# Patient Record
Sex: FEMALE | Race: ASIAN | NOT HISPANIC OR LATINO | Employment: STUDENT | URBAN - METROPOLITAN AREA
[De-identification: names, ages, dates, MRNs, and addresses within clinical notes are randomized per-mention and may not be internally consistent; named-entity substitution may affect disease eponyms.]

---

## 2019-05-12 ENCOUNTER — OFFICE VISIT (OUTPATIENT)
Dept: URGENT CARE | Facility: CLINIC | Age: 24
End: 2019-05-12
Payer: COMMERCIAL

## 2019-05-12 VITALS
HEART RATE: 67 BPM | SYSTOLIC BLOOD PRESSURE: 98 MMHG | DIASTOLIC BLOOD PRESSURE: 57 MMHG | RESPIRATION RATE: 16 BRPM | BODY MASS INDEX: 18.31 KG/M2 | HEIGHT: 61 IN | TEMPERATURE: 98.2 F | WEIGHT: 97 LBS

## 2019-05-12 DIAGNOSIS — J04.0 ACUTE LARYNGITIS: Primary | ICD-10-CM

## 2019-05-12 PROCEDURE — 99213 OFFICE O/P EST LOW 20 MIN: CPT | Performed by: PHYSICIAN ASSISTANT

## 2019-05-12 RX ORDER — FAMOTIDINE 20 MG
1000 TABLET ORAL DAILY
COMMUNITY

## 2019-05-12 RX ORDER — NORETHINDRONE ACETATE AND ETHINYL ESTRADIOL 1MG-20(21)
1 KIT ORAL DAILY
COMMUNITY
Start: 2018-07-22

## 2019-05-12 RX ORDER — ATORVASTATIN CALCIUM 10 MG/1
TABLET, FILM COATED ORAL
Refills: 0 | COMMUNITY
Start: 2019-05-07 | End: 2020-06-04

## 2020-06-04 ENCOUNTER — OFFICE VISIT (OUTPATIENT)
Dept: FAMILY MEDICINE CLINIC | Facility: CLINIC | Age: 25
End: 2020-06-04
Payer: COMMERCIAL

## 2020-06-04 VITALS
OXYGEN SATURATION: 99 % | HEART RATE: 75 BPM | DIASTOLIC BLOOD PRESSURE: 62 MMHG | TEMPERATURE: 98.2 F | SYSTOLIC BLOOD PRESSURE: 90 MMHG | WEIGHT: 97 LBS | BODY MASS INDEX: 18.31 KG/M2 | RESPIRATION RATE: 16 BRPM | HEIGHT: 61 IN

## 2020-06-04 DIAGNOSIS — E78.2 MIXED HYPERLIPIDEMIA: ICD-10-CM

## 2020-06-04 DIAGNOSIS — Z02.0 SCHOOL PHYSICAL EXAM: ICD-10-CM

## 2020-06-04 DIAGNOSIS — E55.9 VITAMIN D DEFICIENCY: ICD-10-CM

## 2020-06-04 DIAGNOSIS — E78.1 HYPERTRIGLYCERIDEMIA: ICD-10-CM

## 2020-06-04 DIAGNOSIS — Z28.39 IMMUNIZATION DEFICIENCY: Primary | ICD-10-CM

## 2020-06-04 PROCEDURE — 90715 TDAP VACCINE 7 YRS/> IM: CPT

## 2020-06-04 PROCEDURE — 99214 OFFICE O/P EST MOD 30 MIN: CPT | Performed by: FAMILY MEDICINE

## 2020-06-04 PROCEDURE — 3008F BODY MASS INDEX DOCD: CPT | Performed by: FAMILY MEDICINE

## 2020-06-04 PROCEDURE — 1036F TOBACCO NON-USER: CPT | Performed by: FAMILY MEDICINE

## 2020-06-04 PROCEDURE — 90471 IMMUNIZATION ADMIN: CPT

## 2020-06-04 RX ORDER — ICOSAPENT ETHYL 1000 MG/1
1 CAPSULE ORAL 2 TIMES DAILY
Qty: 180 CAPSULE | Refills: 2 | Status: SHIPPED | OUTPATIENT
Start: 2020-06-04 | End: 2020-12-23

## 2020-06-04 RX ORDER — ROSUVASTATIN CALCIUM 10 MG/1
10 TABLET, COATED ORAL DAILY
Qty: 90 TABLET | Refills: 1 | Status: SHIPPED | OUTPATIENT
Start: 2020-06-04 | End: 2020-12-22

## 2020-06-08 LAB
25(OH)D3 SERPL-MCNC: 27 NG/ML (ref 30–100)
ALBUMIN SERPL-MCNC: 3.9 G/DL (ref 3.6–5.1)
ALBUMIN/GLOB SERPL: 1.8 (CALC) (ref 1–2.5)
ALP SERPL-CCNC: 31 U/L (ref 31–125)
ALT SERPL-CCNC: 8 U/L (ref 6–29)
AST SERPL-CCNC: 14 U/L (ref 10–30)
BASOPHILS # BLD AUTO: 18 CELLS/UL (ref 0–200)
BASOPHILS NFR BLD AUTO: 0.3 %
BILIRUB SERPL-MCNC: 0.5 MG/DL (ref 0.2–1.2)
BUN SERPL-MCNC: 8 MG/DL (ref 7–25)
BUN/CREAT SERPL: NORMAL (CALC) (ref 6–22)
CALCIUM SERPL-MCNC: 8.9 MG/DL (ref 8.6–10.2)
CHLORIDE SERPL-SCNC: 103 MMOL/L (ref 98–110)
CHOLEST SERPL-MCNC: 185 MG/DL
CHOLEST/HDLC SERPL: 4 (CALC)
CO2 SERPL-SCNC: 26 MMOL/L (ref 20–32)
CREAT SERPL-MCNC: 0.62 MG/DL (ref 0.5–1.1)
EOSINOPHIL # BLD AUTO: 451 CELLS/UL (ref 15–500)
EOSINOPHIL NFR BLD AUTO: 7.4 %
ERYTHROCYTE [DISTWIDTH] IN BLOOD BY AUTOMATED COUNT: 12.6 % (ref 11–15)
GLOBULIN SER CALC-MCNC: 2.2 G/DL (CALC) (ref 1.9–3.7)
GLUCOSE SERPL-MCNC: 81 MG/DL (ref 65–99)
HBV SURFACE AB SER QL IA: REACTIVE
HCT VFR BLD AUTO: 41.2 % (ref 35–45)
HDLC SERPL-MCNC: 46 MG/DL
HGB BLD-MCNC: 13.5 G/DL (ref 11.7–15.5)
LDLC SERPL CALC-MCNC: 119 MG/DL (CALC)
LYMPHOCYTES # BLD AUTO: 1873 CELLS/UL (ref 850–3900)
LYMPHOCYTES NFR BLD AUTO: 30.7 %
MCH RBC QN AUTO: 30.3 PG (ref 27–33)
MCHC RBC AUTO-ENTMCNC: 32.8 G/DL (ref 32–36)
MCV RBC AUTO: 92.4 FL (ref 80–100)
MEV IGG SER IA-ACNC: 19.9 AU/ML
MONOCYTES # BLD AUTO: 476 CELLS/UL (ref 200–950)
MONOCYTES NFR BLD AUTO: 7.8 %
MUV IGG SER IA-ACNC: 18.2 AU/ML
NEUTROPHILS # BLD AUTO: 3282 CELLS/UL (ref 1500–7800)
NEUTROPHILS NFR BLD AUTO: 53.8 %
NONHDLC SERPL-MCNC: 139 MG/DL (CALC)
PLATELET # BLD AUTO: 247 THOUSAND/UL (ref 140–400)
PMV BLD REES-ECKER: 11 FL (ref 7.5–12.5)
POTASSIUM SERPL-SCNC: 4.4 MMOL/L (ref 3.5–5.3)
PROT SERPL-MCNC: 6.1 G/DL (ref 6.1–8.1)
RBC # BLD AUTO: 4.46 MILLION/UL (ref 3.8–5.1)
RUBV IGG SERPL IA-ACNC: 1.57 INDEX
SL AMB EGFR AFRICAN AMERICAN: 148 ML/MIN/1.73M2
SL AMB EGFR NON AFRICAN AMERICAN: 128 ML/MIN/1.73M2
SODIUM SERPL-SCNC: 135 MMOL/L (ref 135–146)
TRIGL SERPL-MCNC: 96 MG/DL
TSH SERPL-ACNC: 1.63 MIU/L
VZV IGG SER IA-ACNC: <135 INDEX
WBC # BLD AUTO: 6.1 THOUSAND/UL (ref 3.8–10.8)

## 2020-06-29 ENCOUNTER — CLINICAL SUPPORT (OUTPATIENT)
Dept: FAMILY MEDICINE CLINIC | Facility: CLINIC | Age: 25
End: 2020-06-29
Payer: COMMERCIAL

## 2020-06-29 DIAGNOSIS — Z28.39 IMMUNIZATION DEFICIENCY: Primary | ICD-10-CM

## 2020-06-29 DIAGNOSIS — Z02.0 SCHOOL PHYSICAL EXAM: ICD-10-CM

## 2020-06-29 PROCEDURE — 90471 IMMUNIZATION ADMIN: CPT

## 2020-06-29 PROCEDURE — 90716 VAR VACCINE LIVE SUBQ: CPT

## 2020-06-29 PROCEDURE — 86580 TB INTRADERMAL TEST: CPT

## 2020-07-28 ENCOUNTER — CLINICAL SUPPORT (OUTPATIENT)
Dept: FAMILY MEDICINE CLINIC | Facility: CLINIC | Age: 25
End: 2020-07-28
Payer: COMMERCIAL

## 2020-07-28 DIAGNOSIS — Z28.39 IMMUNIZATION DEFICIENCY: Primary | ICD-10-CM

## 2020-07-28 PROCEDURE — 90716 VAR VACCINE LIVE SUBQ: CPT

## 2020-07-28 PROCEDURE — 90471 IMMUNIZATION ADMIN: CPT

## 2020-10-12 ENCOUNTER — TELEMEDICINE (OUTPATIENT)
Dept: FAMILY MEDICINE CLINIC | Facility: CLINIC | Age: 25
End: 2020-10-12
Payer: COMMERCIAL

## 2020-10-12 VITALS — HEIGHT: 61 IN | BODY MASS INDEX: 18.31 KG/M2 | WEIGHT: 97 LBS

## 2020-10-12 DIAGNOSIS — K64.9 HEMORRHOIDS, UNSPECIFIED HEMORRHOID TYPE: ICD-10-CM

## 2020-10-12 DIAGNOSIS — G44.52 NEW DAILY PERSISTENT HEADACHE: Primary | ICD-10-CM

## 2020-10-12 DIAGNOSIS — K62.5 RECTAL BLEEDING: ICD-10-CM

## 2020-10-12 PROCEDURE — 1036F TOBACCO NON-USER: CPT | Performed by: NURSE PRACTITIONER

## 2020-10-12 PROCEDURE — 99213 OFFICE O/P EST LOW 20 MIN: CPT | Performed by: NURSE PRACTITIONER

## 2020-10-27 ENCOUNTER — TELEPHONE (OUTPATIENT)
Dept: FAMILY MEDICINE CLINIC | Facility: CLINIC | Age: 25
End: 2020-10-27

## 2020-12-22 DIAGNOSIS — E78.2 MIXED HYPERLIPIDEMIA: ICD-10-CM

## 2020-12-22 RX ORDER — ROSUVASTATIN CALCIUM 10 MG/1
TABLET, COATED ORAL
Qty: 90 TABLET | Refills: 1 | Status: SHIPPED | OUTPATIENT
Start: 2020-12-22 | End: 2021-06-16

## 2020-12-23 ENCOUNTER — OFFICE VISIT (OUTPATIENT)
Dept: FAMILY MEDICINE CLINIC | Facility: CLINIC | Age: 25
End: 2020-12-23
Payer: COMMERCIAL

## 2020-12-23 VITALS
RESPIRATION RATE: 14 BRPM | SYSTOLIC BLOOD PRESSURE: 108 MMHG | WEIGHT: 102.8 LBS | DIASTOLIC BLOOD PRESSURE: 68 MMHG | BODY MASS INDEX: 19.41 KG/M2 | HEART RATE: 88 BPM | TEMPERATURE: 98 F | OXYGEN SATURATION: 99 % | HEIGHT: 61 IN

## 2020-12-23 DIAGNOSIS — E51.9 THIAMINE DEFICIENCY: ICD-10-CM

## 2020-12-23 DIAGNOSIS — R00.2 PALPITATIONS: ICD-10-CM

## 2020-12-23 DIAGNOSIS — E53.1 PYRIDOXINE DEFICIENCY: ICD-10-CM

## 2020-12-23 DIAGNOSIS — E54 ASCORBIC ACID DEFICIENCY: ICD-10-CM

## 2020-12-23 DIAGNOSIS — G44.001 INTRACTABLE CLUSTER HEADACHE SYNDROME, UNSPECIFIED CHRONICITY PATTERN: ICD-10-CM

## 2020-12-23 DIAGNOSIS — Z11.4 SCREENING FOR HIV (HUMAN IMMUNODEFICIENCY VIRUS): ICD-10-CM

## 2020-12-23 DIAGNOSIS — E55.9 VITAMIN D DEFICIENCY: ICD-10-CM

## 2020-12-23 DIAGNOSIS — E78.2 MIXED HYPERLIPIDEMIA: ICD-10-CM

## 2020-12-23 DIAGNOSIS — E53.8 FOLIC ACID DEFICIENCY: ICD-10-CM

## 2020-12-23 DIAGNOSIS — E53.8 CYANOCOBALAMIN DEFICIENCY: ICD-10-CM

## 2020-12-23 DIAGNOSIS — E61.1 IRON DEFICIENCY: ICD-10-CM

## 2020-12-23 DIAGNOSIS — K62.5 BLOOD PER RECTUM: Primary | ICD-10-CM

## 2020-12-23 PROBLEM — G44.009 CLUSTER HEADACHES: Status: ACTIVE | Noted: 2020-12-23

## 2020-12-23 PROCEDURE — 99214 OFFICE O/P EST MOD 30 MIN: CPT | Performed by: FAMILY MEDICINE

## 2020-12-23 PROCEDURE — 1036F TOBACCO NON-USER: CPT | Performed by: FAMILY MEDICINE

## 2020-12-23 PROCEDURE — 3008F BODY MASS INDEX DOCD: CPT | Performed by: FAMILY MEDICINE

## 2020-12-30 PROCEDURE — 88305 TISSUE EXAM BY PATHOLOGIST: CPT | Performed by: PATHOLOGY

## 2020-12-31 ENCOUNTER — LAB REQUISITION (OUTPATIENT)
Dept: LAB | Facility: HOSPITAL | Age: 25
End: 2020-12-31
Payer: COMMERCIAL

## 2020-12-31 DIAGNOSIS — K59.00 CONSTIPATION, UNSPECIFIED: ICD-10-CM

## 2021-02-03 ENCOUNTER — TELEPHONE (OUTPATIENT)
Dept: GASTROENTEROLOGY | Facility: CLINIC | Age: 26
End: 2021-02-03

## 2021-02-04 DIAGNOSIS — K62.89 PROCTITIS: Primary | ICD-10-CM

## 2021-02-04 RX ORDER — HYDROCORTISONE 100 MG/60ML
100 SUSPENSION RECTAL
Qty: 60 ML | Refills: 0 | Status: SHIPPED | OUTPATIENT
Start: 2021-02-04 | End: 2021-02-04 | Stop reason: SDUPTHER

## 2021-02-04 RX ORDER — HYDROCORTISONE 100 MG/60ML
100 SUSPENSION RECTAL
Qty: 60 ML | Refills: 0 | Status: SHIPPED | OUTPATIENT
Start: 2021-02-04 | End: 2021-02-25 | Stop reason: SDUPTHER

## 2021-02-09 NOTE — TELEPHONE ENCOUNTER
Pt informed of results and medication ordered  Please call her back to schedule office visit 2 weeks per Dr Deng Santos

## 2021-02-25 ENCOUNTER — TELEMEDICINE (OUTPATIENT)
Dept: GASTROENTEROLOGY | Facility: CLINIC | Age: 26
End: 2021-02-25
Payer: COMMERCIAL

## 2021-02-25 DIAGNOSIS — K62.89 PROCTITIS: Primary | ICD-10-CM

## 2021-02-25 DIAGNOSIS — K62.5 RECTAL BLEEDING: ICD-10-CM

## 2021-02-25 PROCEDURE — 99213 OFFICE O/P EST LOW 20 MIN: CPT | Performed by: INTERNAL MEDICINE

## 2021-02-25 RX ORDER — BIFIDOBACTERIUM LONGUM SUBSP. INFANTIS, AVOBENZONE, HOMOSALATE, OCTISALATE, OCTOCRYLENE, AND OXYBENZONE
1 KIT DAILY
Qty: 30 TABLET | Refills: 5 | Status: SHIPPED | OUTPATIENT
Start: 2021-02-25 | End: 2021-03-27

## 2021-02-25 RX ORDER — HYDROCORTISONE 100 MG/60ML
100 SUSPENSION RECTAL
Qty: 60 ML | Refills: 0 | Status: SHIPPED | OUTPATIENT
Start: 2021-02-25 | End: 2021-03-12

## 2021-02-25 NOTE — PROGRESS NOTES
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  Patient appears to be doing well  She denies any significant abdominal pain or discomfort  She has infrequent rectal bleeding  She also has had mucus per rectum on occasion  Stools are usually formed and sometimes hard  We had a long discussion regarding her biopsy report showing proctitis  I have advised that she may have possibility of this condition worsen in the future  I have advised her to take probiotics and take Cortenema which was prescribed before  If she has difficulty taking Cortenema she may take half the dose of Cortenema per use  Otherwise we may have to write suppository such as Proctocort  Patient will follow these instructions  Long discussion were made and I have explained to her regarding autoimmune nature of this illness  I have also advised to watch for any other systemic symptoms such as arthritis, episcleritis or skin rashes  In that case she should call me immediately  She understands and she will do so  Assessment/Plan:      See above    Problem List Items Addressed This Visit     None      Visit Diagnoses     Proctitis    -  Primary    Relevant Medications    hydrocortisone (CORTENEMA) 100 mg/60 mL enema    Rectal bleeding                   Reason for visit is   Chief Complaint   Patient presents with    Virtual Regular Visit        Encounter provider Mary Ellen Haile MD    Provider located at [address] [place] [id number]      Recent Visits  No visits were found meeting these conditions     Showing recent visits within past 7 days and meeting all other requirements     Today's Visits  Date Type Provider Dept   02/25/21 Telemedicine Mary Ellen Haile, [facility] [address] [facility]   Showing today's visits and meeting all other requirements     Future Appointments  No visits were found meeting these conditions  Showing future appointments within next 150 days and meeting all other requirements        The patient was identified by name and date of birth  Alisia Narvaez was informed that this is a telemedicine visit and that the visit is being conducted through [facility] and patient was informed that this is a secure, HIPAA-compliant platform  She agrees to proceed     My office door was closed  No one else was in the room  She acknowledged consent and understanding of privacy and security of the video platform  The patient has agreed to participate and understands they can discontinue the visit at any time  Patient is aware this is a billable service  Subjective  Alisia Narvaez is a 23 y o  female  with history of proctitis  She seems to be doing fairly well overall  She continues to have infrequent rectal bleeding and mucus  As discussed above was start her on Cortenema  Previous Cortenema prescription did not go through to the pharmacy  For this reason I will reorder it  She will see me in three months  Further recommendations will be given  Maralvina Press HPI     Past Medical History:   Diagnosis Date    Asthma     Blood per rectum 12/23/2020    Cluster headaches 12/23/2020    Hypercholesterolemia     pt saw cardio in 2016 and was put on Atorvastin    Hyperlipidemia     Patient had seen cardiologist in 2016 and was started on atorvastatin       Hypertriglyceridemia 6/4/2020    Mixed hyperlipidemia 6/4/2020    Palpitations 12/23/2020    School physical exam 6/4/2020    Vitamin D deficiency     Vitamin D deficiency 6/4/2020       Past Surgical History:   Procedure Laterality Date    NO PAST SURGERIES         Current Outpatient Medications   Medication Sig Dispense Refill    hydrocortisone (CORTENEMA) 100 mg/60 mL enema Insert 60 mL (100 mg total) into the rectum daily at bedtime for 15 days 60 mL 0    Icosapent Ethyl (Vascepa) 1 g CAPS Take 1 capsule (1 g total) by mouth 2 (two) times a day 180 capsule 2    norethindrone-ethinyl estradiol (JUNEL FE 1/20) 1-20 MG-MCG per tablet Take 1 tablet by mouth daily      rosuvastatin (CRESTOR) 10 MG tablet TAKE 1 TABLET BY MOUTH EVERY DAY 90 tablet 1    Vitamin D, Cholecalciferol, 1000 units CAPS Take 1,000 Units by mouth daily       No current facility-administered medications for this visit  Allergies   Allergen Reactions    Dust Mite Extract     Pollen Extract        Review of Systems    Video Exam      She appears to be in no distress  She is breathing normally  She is cooperative  There is no abdominal tenderness when she palpates    There were no vitals filed for this visit  Physical Exam     I spent 20 minutes directly with the patient during this visit      VIRTUAL VISIT DISCLAIMER    Sowmya Martinez acknowledges that she has consented to an online visit or consultation  She understands that the online visit is based solely on information provided by her, and that, in the absence of a face-to-face physical evaluation by the physician, the diagnosis she receives is both limited and provisional in terms of accuracy and completeness  This is not intended to replace a full medical face-to-face evaluation by the physician  Sowmya Martinez understands and accepts these terms

## 2021-06-15 DIAGNOSIS — E78.2 MIXED HYPERLIPIDEMIA: ICD-10-CM

## 2021-06-16 RX ORDER — ROSUVASTATIN CALCIUM 10 MG/1
TABLET, COATED ORAL
Qty: 90 TABLET | Refills: 1 | Status: SHIPPED | OUTPATIENT
Start: 2021-06-16 | End: 2021-12-23

## 2021-07-19 ENCOUNTER — OFFICE VISIT (OUTPATIENT)
Dept: FAMILY MEDICINE CLINIC | Facility: CLINIC | Age: 26
End: 2021-07-19
Payer: COMMERCIAL

## 2021-07-19 VITALS
TEMPERATURE: 98.4 F | SYSTOLIC BLOOD PRESSURE: 106 MMHG | OXYGEN SATURATION: 99 % | DIASTOLIC BLOOD PRESSURE: 80 MMHG | BODY MASS INDEX: 20.81 KG/M2 | HEART RATE: 74 BPM | HEIGHT: 61 IN | WEIGHT: 110.2 LBS

## 2021-07-19 DIAGNOSIS — E54 ASCORBIC ACID DEFICIENCY: ICD-10-CM

## 2021-07-19 DIAGNOSIS — K62.5 BLOOD PER RECTUM: Primary | ICD-10-CM

## 2021-07-19 DIAGNOSIS — E53.1 PYRIDOXINE DEFICIENCY: ICD-10-CM

## 2021-07-19 DIAGNOSIS — E53.8 CYANOCOBALAMIN DEFICIENCY: ICD-10-CM

## 2021-07-19 DIAGNOSIS — Z11.4 SCREENING FOR HIV (HUMAN IMMUNODEFICIENCY VIRUS): ICD-10-CM

## 2021-07-19 DIAGNOSIS — K62.89 PROCTITIS: ICD-10-CM

## 2021-07-19 DIAGNOSIS — N39.0 URINARY TRACT INFECTION WITHOUT HEMATURIA, SITE UNSPECIFIED: ICD-10-CM

## 2021-07-19 DIAGNOSIS — E51.9 THIAMINE DEFICIENCY: ICD-10-CM

## 2021-07-19 DIAGNOSIS — Z11.59 NEED FOR HEPATITIS C SCREENING TEST: ICD-10-CM

## 2021-07-19 DIAGNOSIS — L65.9 HAIR LOSS: ICD-10-CM

## 2021-07-19 DIAGNOSIS — E78.2 MIXED HYPERLIPIDEMIA: ICD-10-CM

## 2021-07-19 DIAGNOSIS — E55.9 VITAMIN D DEFICIENCY: ICD-10-CM

## 2021-07-19 PROCEDURE — 1036F TOBACCO NON-USER: CPT | Performed by: FAMILY MEDICINE

## 2021-07-19 PROCEDURE — 3008F BODY MASS INDEX DOCD: CPT | Performed by: FAMILY MEDICINE

## 2021-07-19 PROCEDURE — 87086 URINE CULTURE/COLONY COUNT: CPT | Performed by: FAMILY MEDICINE

## 2021-07-19 PROCEDURE — 99213 OFFICE O/P EST LOW 20 MIN: CPT | Performed by: FAMILY MEDICINE

## 2021-07-19 RX ORDER — LINACLOTIDE 145 UG/1
1 CAPSULE, GELATIN COATED ORAL DAILY
COMMUNITY
Start: 2021-05-10 | End: 2022-01-24

## 2021-07-19 RX ORDER — NITROFURANTOIN 25; 75 MG/1; MG/1
CAPSULE ORAL
COMMUNITY
Start: 2021-06-28 | End: 2021-10-29

## 2021-07-19 RX ORDER — FLUCONAZOLE 200 MG/1
TABLET ORAL
COMMUNITY
Start: 2021-01-27 | End: 2021-10-29

## 2021-07-19 NOTE — PROGRESS NOTES
Assessment/Plan:    Patient advised to get blood work done to repeat urine culture to make sure infection is gone  Will check for inflammatory bowel disease cascade since she has proctitis  Colonoscopy biopsy show benign mucosa that caused a polyp but no malignancy  She had a COVID vaccine done 6 months ago  I have spent 25 minutes with Patient  today in which greater than 50% of this time was spent in counseling/coordination of care regarding Prognosis, Risks and benefits of tx options and Intructions for management  Problem List Items Addressed This Visit        Digestive    Blood per rectum - Primary    Relevant Orders    CBC and differential    Iron Panel (Includes Ferritin, Iron Sat%, Iron, and TIBC)    Proctitis    Relevant Orders    Inflammatory Bowel Disease-IBD       Genitourinary    UTI (urinary tract infection)    Relevant Orders    UA w Reflex to Microscopic w Reflex to Culture    Urine culture       Other    Mixed hyperlipidemia    Relevant Orders    Comprehensive metabolic panel    Lipid Panel with Direct LDL reflex    TSH, 3rd generation with Free T4 reflex    Uric acid    Vitamin D deficiency    Relevant Orders    Vitamin A    Vitamin D 25 hydroxy      Other Visit Diagnoses     Need for hepatitis C screening test        Relevant Orders    Hepatitis C Antibody ([facility])    Screening for HIV (human immunodeficiency virus)        Relevant Orders    Rapid HIV 1/2 AB-AG Combo    Thiamine deficiency        Relevant Orders    Vitamin B1 (Thiamine), Serum/Plasma, LC/MS/MS    Cyanocobalamin deficiency        Relevant Orders    Vitamin B12    H  pylori antigen, stool    Anti-parietal antibody    Celiac Disease Panel    Intrinsic factor blocking antibody    Pyridoxine deficiency        Relevant Orders    Vitamin B6    Ascorbic acid deficiency        Relevant Orders    Vitamin C            Subjective:      Patient ID: Dandy Greenwood is a 27 y o  female      81-year-old female follow-up hyperlipidemia had loss blood per rectum  She has seen GI and had colonoscopy done showed proctitis  Since that episode has resolved from using rectal suppository of hydrocortisone  She is taking Crestor 10 mg daily for cholesterol  She has follow-up with gyn for birth control she is sexually active  She has at risk for frequent UTI  Last UTI was couple weeks ago  She used to get episode after intimacy  She also continued to have hair loss  She has appointment dermatologist for platelet rich plasma treatment and evaluation  The following portions of the patient's history were reviewed and updated as appropriate:   Past Medical History:  She has a past medical history of Allergic, Asthma, Blood per rectum (12/23/2020), Clotting disorder [facility]) (september 2020), Cluster headaches (12/23/2020), GERD (gastroesophageal reflux disease) (dec 2020), Hair loss (7/19/2021), Headache(784 0) (oct 2020), Hypercholesterolemia, Hyperlipidemia, Hypertriglyceridemia (6/4/2020), Mixed hyperlipidemia (6/4/2020), Palpitations (12/23/2020), Proctitis (7/19/2021), School physical exam (6/4/2020), UTI (urinary tract infection) (7/19/2021), Vitamin D deficiency, and Vitamin D deficiency (6/4/2020)  ,  _______________________________________________________________________  Medical Problems:  does not have any pertinent problems on file ,  _______________________________________________________________________  Past Surgical History:   has a past surgical history that includes No past surgeries  ,  _______________________________________________________________________  Family History:  family history includes Anxiety disorder in her sister; Cancer in her maternal grandmother; Heart disease in her father, maternal grandfather, and mother; Hyperlipidemia in her father; Hypertension in her father; Kidney cancer in her maternal grandmother; Kidney disease in her maternal grandmother; Stroke in her maternal grandmother;  Thyroid disease in her mother ,  _______________________________________________________________________  Social History:   reports that she has never smoked  She has never used smokeless tobacco  She reports current alcohol use  She reports that she does not use drugs  ,  _______________________________________________________________________  Allergies:  is allergic to dust mite extract and pollen extract     _______________________________________________________________________  Current Outpatient Medications   Medication Sig Dispense Refill    B Complex Vitamins (VITAMIN B COMPLEX PO) Take by mouth      Linzess 145 MCG CAPS Take 1 capsule by mouth daily      norethindrone-ethinyl estradiol (JUNEL FE 1/20) 1-20 MG-MCG per tablet Take 1 tablet by mouth daily      rosuvastatin (CRESTOR) 10 MG tablet TAKE 1 TABLET BY MOUTH EVERY DAY 90 tablet 1    Vitamin D, Cholecalciferol, 1000 units CAPS Take 1,000 Units by mouth daily      fluconazole (DIFLUCAN) 200 mg tablet Take 1 tab po x 1 dose  If no improvement in sx in 3 days please repeat one dose  (Patient not taking: Reported on 7/19/2021)      hydrocortisone (CORTENEMA) 100 mg/60 mL enema Insert 60 mL (100 mg total) into the rectum daily at bedtime for 15 days 60 mL 0    Icosapent Ethyl (Vascepa) 1 g CAPS Take 1 capsule (1 g total) by mouth 2 (two) times a day 180 capsule 2    nitrofurantoin (MACROBID) 100 mg capsule TAKE 1 CAPSULE EVERY 12 HOURS BY ORAL ROUTE FOR 5 DAYS  (Patient not taking: Reported on 7/19/2021)       No current facility-administered medications for this visit      _______________________________________________________________________  Review of Systems   Constitutional: Negative for activity change, appetite change, fatigue and unexpected weight change  HENT: Negative for ear pain, sore throat, trouble swallowing and voice change  Eyes: Negative for photophobia and visual disturbance     Respiratory: Negative for cough, chest tightness, shortness of breath and wheezing  Cardiovascular: Negative for chest pain, palpitations and leg swelling  Gastrointestinal: Negative for abdominal pain, constipation, diarrhea, nausea, rectal pain and vomiting  Endocrine: Negative for cold intolerance, polydipsia and polyuria  Genitourinary: Negative for difficulty urinating, dysuria, flank pain, menstrual problem and pelvic pain  Musculoskeletal: Negative for arthralgias, joint swelling and myalgias  Skin: Negative for color change and rash  Allergic/Immunologic: Negative for environmental allergies and immunocompromised state  Neurological: Negative for dizziness, weakness, numbness and headaches  Hematological: Negative for adenopathy  Does not bruise/bleed easily  Psychiatric/Behavioral: Negative for decreased concentration, dysphoric mood, self-injury, sleep disturbance and suicidal ideas  The patient is not nervous/anxious  Objective:  Vitals:    07/19/21 1449   BP: 106/80   BP Location: Left arm   Patient Position: Sitting   Cuff Size: Standard   Pulse: 74   Temp: 98 4 °F (36 9 °C)   TempSrc: Temporal   SpO2: 99%   Weight: 50 kg (110 lb 3 2 oz)   Height: 5' 1" (1 549 m)     Body mass index is 20 82 kg/m²  Physical Exam  Vitals and nursing note reviewed  Constitutional:       Appearance: Normal appearance  She is well-developed  HENT:      Head: Normocephalic and atraumatic  Mouth/Throat:      Pharynx: No oropharyngeal exudate  Eyes:      Pupils: Pupils are equal, round, and reactive to light  Neck:      Thyroid: No thyromegaly  Cardiovascular:      Rate and Rhythm: Normal rate and regular rhythm  Heart sounds: Normal heart sounds  No murmur heard  Pulmonary:      Effort: Pulmonary effort is normal  No respiratory distress  Breath sounds: Normal breath sounds  No wheezing or rales  Abdominal:      General: Bowel sounds are normal  There is no distension  Palpations: Abdomen is soft  Tenderness: There is no abdominal tenderness  There is no guarding  Genitourinary:     Vagina: Normal    Musculoskeletal:         General: No tenderness  Normal range of motion  Cervical back: Normal range of motion and neck supple  Skin:     General: Skin is warm and dry  Findings: No rash  Neurological:      General: No focal deficit present  Mental Status: She is alert and oriented to person, place, and time  Mental status is at baseline  Psychiatric:         Mood and Affect: Mood normal          Behavior: Behavior normal          Thought Content:  Thought content normal          Judgment: Judgment normal

## 2021-07-21 LAB — BACTERIA UR CULT: NORMAL

## 2021-09-03 ENCOUNTER — TELEPHONE (OUTPATIENT)
Dept: GASTROENTEROLOGY | Facility: CLINIC | Age: 26
End: 2021-09-03

## 2021-09-03 NOTE — TELEPHONE ENCOUNTER
Pt is away at [facility] and is scheduled for a virtual visit with Dr Sunil Segundo on 9/16/21, however, would like to know what she can do in the mean time for her rectal bleeding  Please call her back at [phone]  Thank you so much!

## 2021-09-03 NOTE — TELEPHONE ENCOUNTER
Patient called  She states a week ago she started having a little bit of blood with bowel movements  Today she noticed it was more blood than it has been  She saw Dr Mary Duff back in February 2021  She takes probiotic daily along with crestor and birth control   Thank you

## 2021-09-03 NOTE — TELEPHONE ENCOUNTER
Patient with history of proctitis,  patient states that she did have improvement of bleeding with Ever enema so we will order  Ever enema    She will follow up with us on the 16th but I did advise her that if persistent bleeding then she should be evaluated at ER urgent care,  patient was concerned about drop in hemoglobin and I did tell her that I can order a CBC and if she gets me a fax number then I will fax script for her

## 2021-09-16 ENCOUNTER — TELEMEDICINE (OUTPATIENT)
Dept: GASTROENTEROLOGY | Facility: CLINIC | Age: 26
End: 2021-09-16
Payer: COMMERCIAL

## 2021-09-16 DIAGNOSIS — K62.89 PROCTITIS: Primary | ICD-10-CM

## 2021-09-16 PROCEDURE — 99213 OFFICE O/P EST LOW 20 MIN: CPT | Performed by: PHYSICIAN ASSISTANT

## 2021-09-16 NOTE — PROGRESS NOTES
Virtual Regular Visit    Verification of patient location: [place]      Patient is located in the following state in which I hold an active license, Which is the [facility] [place] [facility]    Assessment/Plan:    Problem List Items Addressed This Visit        Digestive    Proctitis - Primary    Relevant Orders    Calprotectin,Fecal    C-reactive protein         This is a 79-year-old female who presents for virtual visit  Patient does have history of rectal bleeding and previous colonoscopy did show colon polyps as well as proctitis  Explained etiology that proctitis could just be a nonspecific finding verses the start of IBD  Patient has had recurrent rectal bleeding and patient denies any rectal pain but she does have history of a fissure but she seems to be responding to the Cortenema slowly and advised her to continue on this for 2 more weeks nightly and then taper to every other night for 2 weeks and then discontinue and we will see her in 6 weeks for a virtual visit as she again is still out of the area  Ideally I explained to the patient that she should have an in office visit when she returns home for a break  In the interim ,she should have blood work which was ordered from her primary doctor and I did order a CRP as well as a fecal calprotectin and there was an IBD panel ordered as well which likely includes the asca and ANCA testing  If persistent bleeding then may need repeat flexible sigmoidoscopy in future  Patient advised to call if bleeding does not dissipate completely in the near future  Reason for visit is   Chief Complaint   Patient presents with    Virtual Regular Visit        Encounter provider Christine Dunlap PA-C    Provider located at [address]      Recent Visits  No visits were found meeting these conditions    Showing recent visits within past 7 days and meeting all other requirements  Future Appointments  No visits were found meeting these conditions  Showing future appointments within next 150 days and meeting all other requirements       The patient was identified by name and date of birth  Lore Mitchell was informed that this is a telemedicine visit and that the visit is being conducted through  [employer] visit  She acknowledged consent and understanding of privacy and security of the video platform  The patient has agreed to participate and understands they can discontinue the visit at any time  Patient is aware this is a billable service  Subjective  Lore Mitchell is a 27 y o  female  Who presents for virtual visit today for complaints of rectal bleeding   Patient did have colonoscopy last year which had shown questionable proctitis colon polyp and a small fissure  I had spoken to the patient on 09/03 and she was complaining of rectal bleeding which was more significant with wiping and mixed with the stool and patient had been treated previously with Ever enema so Cortenema was refilled but she did not start taking  It until  A few days after due to holiday weekend  Patient states that bleeding is still present although it is improved  Patient denies any change in medication such as NSAID use or any sexual activity that could cause rectal bleeding/ proctitis  She denies any exacerbating factors and 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 [employer]  Patient also was noted to have a hyperplastic polyp and colonoscopy was recommended in 5-7 years  Patient is not having any difficulty with diarrhea and actually does have some constipation and is taking Linzess as needed  Blood work was ordered through her primary doctor including an IBD panel as well as general blood work  Patient did not get the blood work get since she has been out of the area   Biopsies of the rectum did show some focal cryptitis and acute inflammation  HPI     Past Medical History:   Diagnosis Date    Allergic     pollen/seasonal    Asthma     Blood per rectum 12/23/2020    Clotting disorder [facility]) september 2020    in stool    Cluster headaches 12/23/2020    GERD (gastroesophageal reflux disease) dec 2020    experiencing gas pains    Hair loss 7/19/2021    Headache(784 0) oct 2020    in mornings    Hypercholesterolemia     pt saw cardio in 2016 and was put on Atorvastin    Hyperlipidemia     Patient had seen cardiologist in 2016 and was started on atorvastatin   Hypertriglyceridemia 6/4/2020    Mixed hyperlipidemia 6/4/2020    Palpitations 12/23/2020    Proctitis 7/19/2021    School physical exam 6/4/2020    UTI (urinary tract infection) 7/19/2021    Vitamin D deficiency     Vitamin D deficiency 6/4/2020       Past Surgical History:   Procedure Laterality Date    NO PAST SURGERIES         Current Outpatient Medications   Medication Sig Dispense Refill    B Complex Vitamins (VITAMIN B COMPLEX PO) Take by mouth      fluconazole (DIFLUCAN) 200 mg tablet Take 1 tab po x 1 dose  If no improvement in sx in 3 days please repeat one dose   (Patient not taking: Reported on 7/19/2021)      hydrocortisone (CORTENEMA) 100 mg/60 mL enema Insert 60 mL (100 mg total) into the rectum daily at bedtime for 15 days 60 mL 0    hydrocortisone (CORTENEMA) 100 mg/60 mL enema Insert 60 mL (100 mg total) into the rectum daily at bedtime 2 packs of 12 1440 mL 1    Icosapent Ethyl (Vascepa) 1 g CAPS Take 1 capsule (1 g total) by mouth 2 (two) times a day 180 capsule 2    Linzess 145 MCG CAPS Take 1 capsule by mouth daily      nitrofurantoin (MACROBID) 100 mg capsule TAKE 1 CAPSULE EVERY 12 HOURS BY ORAL ROUTE FOR 5 DAYS  (Patient not taking: Reported on 7/19/2021)      norethindrone-ethinyl estradiol (JUNEL FE 1/20) 1-20 MG-MCG per tablet Take 1 tablet by mouth daily      rosuvastatin (CRESTOR) 10 MG tablet TAKE 1 TABLET BY MOUTH EVERY DAY 90 tablet 1    Vitamin D, Cholecalciferol, 1000 units CAPS Take 1,000 Units by mouth daily       No current facility-administered medications for this visit  Allergies   Allergen Reactions    Dust Mite Extract     Pollen Extract         all other systems reviewed and were negative otherwise please refer to HPI    Video Exam    There were no vitals filed for this visit  physical exam was deferred due to virtual visit   time spent was 30 minutes    VIRTUAL VISIT DISCLAIMER      Dora Kulwinder verbally agrees to participate in [employer]  Pt is aware that [employer] could be limited without vital signs or the ability to perform a full hands-on physical Derik Quispe understands she or the provider may request at any time to terminate the video visit and request the patient to seek care or treatment in person

## 2021-10-21 LAB
25(OH)D3+25(OH)D2 SERPL-MCNC: 29.9 NG/ML (ref 30–100)
ALBUMIN SERPL-MCNC: 4.4 G/DL (ref 3.9–5)
ALBUMIN/GLOB SERPL: 1.7 {RATIO} (ref 1.2–2.2)
ALP SERPL-CCNC: 32 IU/L (ref 44–121)
ALT SERPL-CCNC: 10 IU/L (ref 0–32)
APPEARANCE UR: CLEAR
AST SERPL-CCNC: 12 IU/L (ref 0–40)
BACTERIA UR CULT: NORMAL
BACTERIA URNS QL MICRO: NORMAL
BAKER'S YEAST IGA QN: <20 UNITS (ref 0–24.9)
BAKER'S YEAST IGG QN: 25.4 UNITS (ref 0–24.9)
BASOPHILS # BLD AUTO: 0 X10E3/UL (ref 0–0.2)
BASOPHILS NFR BLD AUTO: 0 %
BILIRUB SERPL-MCNC: 0.6 MG/DL (ref 0–1.2)
BILIRUB UR QL STRIP: NEGATIVE
BUN SERPL-MCNC: 12 MG/DL (ref 6–20)
BUN/CREAT SERPL: 23 (ref 9–23)
CALCIUM SERPL-MCNC: 9.6 MG/DL (ref 8.7–10.2)
CASTS URNS QL MICRO: NORMAL /LPF
CHLORIDE SERPL-SCNC: 99 MMOL/L (ref 96–106)
CHOLEST SERPL-MCNC: 180 MG/DL (ref 100–199)
CO2 SERPL-SCNC: 23 MMOL/L (ref 20–29)
COLOR UR: YELLOW
CREAT SERPL-MCNC: 0.52 MG/DL (ref 0.57–1)
ENDOMYSIUM IGA SER QL: NEGATIVE
EOSINOPHIL # BLD AUTO: 0 X10E3/UL (ref 0–0.4)
EOSINOPHIL NFR BLD AUTO: 0 %
EPI CELLS #/AREA URNS HPF: NORMAL /HPF (ref 0–10)
ERYTHROCYTE [DISTWIDTH] IN BLOOD BY AUTOMATED COUNT: 13.2 % (ref 11.7–15.4)
GLOBULIN SER-MCNC: 2.6 G/DL (ref 1.5–4.5)
GLUCOSE SERPL-MCNC: 109 MG/DL (ref 65–99)
GLUCOSE UR QL: NEGATIVE
HCT VFR BLD AUTO: 40.2 % (ref 34–46.6)
HCV AB S/CO SERPL IA: <0.1 S/CO RATIO (ref 0–0.9)
HDLC SERPL-MCNC: 66 MG/DL
HGB BLD-MCNC: 13.4 G/DL (ref 11.1–15.9)
HGB UR QL STRIP: ABNORMAL
HIV 1+2 AB+HIV1 P24 AG SERPL QL IA: NON REACTIVE
IF BLOCK AB SER QL RIA: 1 AU/ML (ref 0–1.1)
IGA SERPL-MCNC: 198 MG/DL (ref 87–352)
IMM GRANULOCYTES # BLD: 0 X10E3/UL (ref 0–0.1)
IMM GRANULOCYTES NFR BLD: 0 %
IRON SATN MFR SERPL: 30 % (ref 15–55)
IRON SERPL-MCNC: 102 UG/DL (ref 27–159)
KETONES UR QL STRIP: ABNORMAL
LDLC SERPL CALC-MCNC: 99 MG/DL (ref 0–99)
LDLC/HDLC SERPL: 1.5 RATIO (ref 0–3.2)
LEUKOCYTE ESTERASE UR QL STRIP: ABNORMAL
LYMPHOCYTES # BLD AUTO: 1.1 X10E3/UL (ref 0.7–3.1)
LYMPHOCYTES NFR BLD AUTO: 12 %
Lab: NORMAL
MCH RBC QN AUTO: 30.1 PG (ref 26.6–33)
MCHC RBC AUTO-ENTMCNC: 33.3 G/DL (ref 31.5–35.7)
MCV RBC AUTO: 90 FL (ref 79–97)
MICRO URNS: ABNORMAL
MONOCYTES # BLD AUTO: 0.2 X10E3/UL (ref 0.1–0.9)
MONOCYTES NFR BLD AUTO: 2 %
NEUTROPHILS # BLD AUTO: 7.6 X10E3/UL (ref 1.4–7)
NEUTROPHILS NFR BLD AUTO: 86 %
NITRITE UR QL STRIP: NEGATIVE
P-ANCA ATYPICAL TITR SER IF: ABNORMAL TITER
PCA AB SER-ACNC: 1.4 UNITS (ref 0–20)
PH UR STRIP: 6.5 [PH] (ref 5–7.5)
PLATELET # BLD AUTO: 321 X10E3/UL (ref 150–450)
POTASSIUM SERPL-SCNC: 4.3 MMOL/L (ref 3.5–5.2)
PROT SERPL-MCNC: 7 G/DL (ref 6–8.5)
PROT UR QL STRIP: NEGATIVE
RBC # BLD AUTO: 4.45 X10E6/UL (ref 3.77–5.28)
RBC #/AREA URNS HPF: NORMAL /HPF (ref 0–2)
SL AMB EGFR AFRICAN AMERICAN: 155 ML/MIN/1.73
SL AMB EGFR NON AFRICAN AMERICAN: 134 ML/MIN/1.73
SL AMB URINALYSIS REFLEX: ABNORMAL
SL AMB VLDL CHOLESTEROL CALC: 15 MG/DL (ref 5–40)
SODIUM SERPL-SCNC: 135 MMOL/L (ref 134–144)
SP GR UR: 1.02 (ref 1–1.03)
TIBC SERPL-MCNC: 345 UG/DL (ref 250–450)
TRIGL SERPL-MCNC: 83 MG/DL (ref 0–149)
TSH SERPL DL<=0.005 MIU/L-ACNC: 0.56 UIU/ML (ref 0.45–4.5)
TTG IGA SER-ACNC: <2 U/ML (ref 0–3)
UIBC SERPL-MCNC: 243 UG/DL (ref 131–425)
URATE SERPL-MCNC: 4.1 MG/DL (ref 2.6–6.2)
UROBILINOGEN UR STRIP-ACNC: 0.2 MG/DL (ref 0.2–1)
VIT A SERPL-MCNC: 41.6 UG/DL (ref 18.9–57.3)
VIT B1 BLD-SCNC: 132.7 NMOL/L (ref 66.5–200)
VIT B12 SERPL-MCNC: 385 PG/ML (ref 232–1245)
VIT B6 SERPL-MCNC: 11.8 UG/L (ref 2–32.8)
VIT C SERPL-MCNC: 0.5 MG/DL (ref 0.4–2)
WBC # BLD AUTO: 8.9 X10E3/UL (ref 3.4–10.8)
WBC #/AREA URNS HPF: NORMAL /HPF (ref 0–5)

## 2021-10-25 ENCOUNTER — TELEMEDICINE (OUTPATIENT)
Dept: GASTROENTEROLOGY | Facility: CLINIC | Age: 26
End: 2021-10-25
Payer: COMMERCIAL

## 2021-10-25 DIAGNOSIS — K62.5 RECTAL BLEEDING: ICD-10-CM

## 2021-10-25 DIAGNOSIS — K62.89 PROCTITIS: Primary | ICD-10-CM

## 2021-10-25 DIAGNOSIS — K63.5 HYPERPLASTIC COLONIC POLYP, UNSPECIFIED PART OF COLON: ICD-10-CM

## 2021-10-25 PROCEDURE — 99213 OFFICE O/P EST LOW 20 MIN: CPT | Performed by: INTERNAL MEDICINE

## 2021-10-25 PROCEDURE — 1036F TOBACCO NON-USER: CPT | Performed by: INTERNAL MEDICINE

## 2021-10-25 RX ORDER — MESALAMINE 1000 MG/1
1000 SUPPOSITORY RECTAL
Qty: 30 SUPPOSITORY | Refills: 1 | Status: SHIPPED | OUTPATIENT
Start: 2021-10-25 | End: 2021-11-23

## 2021-10-29 ENCOUNTER — TELEMEDICINE (OUTPATIENT)
Dept: FAMILY MEDICINE CLINIC | Facility: CLINIC | Age: 26
End: 2021-10-29
Payer: COMMERCIAL

## 2021-10-29 VITALS — WEIGHT: 110 LBS | HEIGHT: 61 IN | BODY MASS INDEX: 20.77 KG/M2

## 2021-10-29 DIAGNOSIS — E53.8 CYANOCOBALAMIN DEFICIENCY: ICD-10-CM

## 2021-10-29 DIAGNOSIS — U07.1 COVID-19: ICD-10-CM

## 2021-10-29 DIAGNOSIS — E54 ASCORBIC ACID DEFICIENCY: ICD-10-CM

## 2021-10-29 DIAGNOSIS — E55.9 VITAMIN D DEFICIENCY: ICD-10-CM

## 2021-10-29 DIAGNOSIS — K62.89 PROCTITIS: ICD-10-CM

## 2021-10-29 DIAGNOSIS — E78.2 MIXED HYPERLIPIDEMIA: Primary | ICD-10-CM

## 2021-10-29 PROCEDURE — 99214 OFFICE O/P EST MOD 30 MIN: CPT | Performed by: FAMILY MEDICINE

## 2021-10-29 PROCEDURE — 3008F BODY MASS INDEX DOCD: CPT | Performed by: INTERNAL MEDICINE

## 2021-10-29 RX ORDER — MINOXIDIL 2.5 MG/1
TABLET ORAL
COMMUNITY
Start: 2021-10-04

## 2021-10-29 RX ORDER — TRETINOIN 0.5 MG/G
CREAM TOPICAL
COMMUNITY
Start: 2021-10-04

## 2021-11-20 DIAGNOSIS — K62.89 PROCTITIS: ICD-10-CM

## 2021-11-20 DIAGNOSIS — K62.5 RECTAL BLEEDING: ICD-10-CM

## 2021-11-23 RX ORDER — MESALAMINE 1000 MG/1
1000 SUPPOSITORY RECTAL
Qty: 30 SUPPOSITORY | Refills: 1 | Status: SHIPPED | OUTPATIENT
Start: 2021-11-23 | End: 2021-12-22

## 2021-12-20 ENCOUNTER — TELEPHONE (OUTPATIENT)
Dept: GASTROENTEROLOGY | Facility: CLINIC | Age: 26
End: 2021-12-20

## 2021-12-20 ENCOUNTER — OFFICE VISIT (OUTPATIENT)
Dept: GASTROENTEROLOGY | Facility: CLINIC | Age: 26
End: 2021-12-20
Payer: COMMERCIAL

## 2021-12-20 VITALS
SYSTOLIC BLOOD PRESSURE: 106 MMHG | DIASTOLIC BLOOD PRESSURE: 77 MMHG | WEIGHT: 110 LBS | HEIGHT: 61 IN | HEART RATE: 77 BPM | BODY MASS INDEX: 20.77 KG/M2

## 2021-12-20 DIAGNOSIS — R89.9 ABNORMAL LABORATORY TEST RESULT: ICD-10-CM

## 2021-12-20 DIAGNOSIS — K62.89 PROCTITIS: Primary | ICD-10-CM

## 2021-12-20 PROCEDURE — 3008F BODY MASS INDEX DOCD: CPT | Performed by: PHYSICIAN ASSISTANT

## 2021-12-20 PROCEDURE — 1036F TOBACCO NON-USER: CPT | Performed by: PHYSICIAN ASSISTANT

## 2021-12-20 PROCEDURE — 99214 OFFICE O/P EST MOD 30 MIN: CPT | Performed by: PHYSICIAN ASSISTANT

## 2021-12-21 DIAGNOSIS — K62.5 RECTAL BLEEDING: ICD-10-CM

## 2021-12-21 DIAGNOSIS — K62.89 PROCTITIS: ICD-10-CM

## 2021-12-22 RX ORDER — MESALAMINE 1000 MG/1
1000 SUPPOSITORY RECTAL
Qty: 90 SUPPOSITORY | Refills: 1 | Status: SHIPPED | OUTPATIENT
Start: 2021-12-22

## 2021-12-23 DIAGNOSIS — E78.2 MIXED HYPERLIPIDEMIA: ICD-10-CM

## 2021-12-23 RX ORDER — ROSUVASTATIN CALCIUM 10 MG/1
TABLET, COATED ORAL
Qty: 90 TABLET | Refills: 1 | Status: SHIPPED | OUTPATIENT
Start: 2021-12-23 | End: 2022-07-08

## 2022-01-24 DIAGNOSIS — K58.1 IRRITABLE BOWEL SYNDROME WITH CONSTIPATION: Primary | ICD-10-CM

## 2022-01-24 RX ORDER — LINACLOTIDE 145 UG/1
CAPSULE, GELATIN COATED ORAL
Qty: 30 CAPSULE | Refills: 2 | Status: SHIPPED | OUTPATIENT
Start: 2022-01-24 | End: 2022-01-24 | Stop reason: SDUPTHER

## 2022-01-24 RX ORDER — LINACLOTIDE 145 UG/1
1 CAPSULE, GELATIN COATED ORAL DAILY
Qty: 30 CAPSULE | Refills: 2 | Status: SHIPPED | OUTPATIENT
Start: 2022-01-24 | End: 2022-02-18

## 2022-01-24 NOTE — TELEPHONE ENCOUNTER
Patient has upcoming appointment with Dr Barba Coverwyatt on 3/10/22 which was scheduled in December

## 2022-01-29 ENCOUNTER — TELEPHONE (OUTPATIENT)
Dept: GASTROENTEROLOGY | Facility: CLINIC | Age: 27
End: 2022-01-29

## 2022-01-29 NOTE — TELEPHONE ENCOUNTER
Called patient and left a message in her answering machine  I have advised her to call me or contact me through patient chart if there is any lingering issues  Have also advised her to let me know if she needs to be seen sooner than scheduled

## 2022-02-18 DIAGNOSIS — K58.1 IRRITABLE BOWEL SYNDROME WITH CONSTIPATION: ICD-10-CM

## 2022-02-18 RX ORDER — LINACLOTIDE 145 UG/1
CAPSULE, GELATIN COATED ORAL
Qty: 90 CAPSULE | Refills: 1 | Status: SHIPPED | OUTPATIENT
Start: 2022-02-18

## 2022-03-23 ENCOUNTER — HOSPITAL ENCOUNTER (OUTPATIENT)
Dept: MRI IMAGING | Facility: HOSPITAL | Age: 27
Discharge: HOME | End: 2022-03-23
Payer: COMMERCIAL

## 2022-03-23 DIAGNOSIS — K62.89 PROCTITIS: ICD-10-CM

## 2022-03-23 PROCEDURE — G1004 CDSM NDSC: HCPCS

## 2022-03-23 PROCEDURE — 72197 MRI PELVIS W/O & W/DYE: CPT

## 2022-03-23 PROCEDURE — 74183 MRI ABD W/O CNTR FLWD CNTR: CPT

## 2022-03-23 PROCEDURE — A9585 GADOBUTROL INJECTION: HCPCS | Performed by: PHYSICIAN ASSISTANT

## 2022-03-23 RX ADMIN — GLUCAGON HYDROCHLORIDE 1 MG: KIT at 08:55

## 2022-03-23 RX ADMIN — GADOBUTROL 5 ML: 604.72 INJECTION INTRAVENOUS at 09:03

## 2022-07-07 DIAGNOSIS — E78.2 MIXED HYPERLIPIDEMIA: ICD-10-CM

## 2022-07-08 RX ORDER — ROSUVASTATIN CALCIUM 10 MG/1
TABLET, COATED ORAL
Qty: 90 TABLET | Refills: 1 | Status: SHIPPED | OUTPATIENT
Start: 2022-07-08

## 2022-10-12 PROBLEM — N39.0 UTI (URINARY TRACT INFECTION): Status: RESOLVED | Noted: 2021-07-19 | Resolved: 2022-10-12

## 2023-05-24 ENCOUNTER — TELEPHONE (OUTPATIENT)
Dept: GASTROENTEROLOGY | Facility: CLINIC | Age: 28
End: 2023-05-24

## 2023-05-24 NOTE — TELEPHONE ENCOUNTER
Called and spoke with pt  She scheduled with Dr Augustus Rowan most recently because there was an opening sooner for her problem  Dr Shalini Horn didn't have any availability and Dr Bard Hatch appointment 7/12 was just cancelled because she got in sooner with Dr Augustus Rowan

## 2023-05-24 NOTE — TELEPHONE ENCOUNTER
Dr Mary Teixeira pt booked with Dr Marshal Gunderson through My Chart      Pt also My Chart'd an appt with Dr Paola Su for 07/12/23

## 2023-05-31 ENCOUNTER — OFFICE VISIT (OUTPATIENT)
Dept: GASTROENTEROLOGY | Facility: AMBULARY SURGERY CENTER | Age: 28
End: 2023-05-31

## 2023-05-31 VITALS
DIASTOLIC BLOOD PRESSURE: 76 MMHG | BODY MASS INDEX: 21.56 KG/M2 | HEART RATE: 81 BPM | WEIGHT: 114.2 LBS | OXYGEN SATURATION: 100 % | HEIGHT: 61 IN | SYSTOLIC BLOOD PRESSURE: 110 MMHG

## 2023-05-31 DIAGNOSIS — K62.89 PROCTITIS: ICD-10-CM

## 2023-05-31 DIAGNOSIS — K58.1 IRRITABLE BOWEL SYNDROME WITH CONSTIPATION: ICD-10-CM

## 2023-05-31 DIAGNOSIS — K62.5 RECTAL BLEEDING: Primary | ICD-10-CM

## 2023-05-31 RX ORDER — LINACLOTIDE 145 UG/1
1 CAPSULE, GELATIN COATED ORAL DAILY
Qty: 90 CAPSULE | Refills: 2 | Status: SHIPPED | OUTPATIENT
Start: 2023-05-31

## 2023-05-31 NOTE — PROGRESS NOTES
" [facility]  Progress Note - Andrea Turner 24 y o  female MRN: [id number]    Unit/Bed#:  Encounter: [id number]    Assessment/Plan:    1  Rectal bleeding-possibly hemorrhoidal versus proctitis  Previous colonoscopy was notable for proctitis  IBD panel was notable for mild elevation of ASCA  Fortunately, MRE not show any small bowel inflammation or strictures  Biopsies from previous colonoscopy did not demonstrate any chronicity   -I would recommend optimizing the treatment for constipation with the resuming Linzess on regular basis instead of as needed  -Recommend colonoscopy with ileal intubation to assess for inflammatory bowel disease and to assess for proctitis  -Avoid bearing down, avoid straining   -Check CBC, CMP and thyroid function  Subjective:   82-year-old female with history of proctitis presents for follow-up  Patient has previously seen Dr George Leiva and Dr Khadijah Yu  She underwent colonoscopy in 2020 with findings suggestive of proctitis and a colon polyp  Polyp was hyperplastic  Biopsies of the rectum were notable for focal cryptitis and acute inflammation  She was prescribed Canasa suppositories however her bleeding had stopped  She reports that the bleeding recurs when she is constipated  She was prescribed Linzess in the past however has been taking it infrequently  IBD panel was also noted to be mildly positive for ASCA  Objective:     Vitals: Blood pressure 110/76, pulse 81, height 5' 1\" (1 549 m), weight 51 8 kg (114 lb 3 2 oz), SpO2 100 %  ,Body mass index is 21 58 kg/m²  [unfilled]    Physical Exam:    GEN: wn/wd, NAD  HEENT: MMM, anciteric  SKIN: no rashes  NEURO: aaox3  Rectum: No anal fissure, normal rectal tone, internal hemorrhoid palpated  No blood on the glove  Invasive Devices     None                         Lab, Imaging and other studies:     No visits with results within 1 Day(s) from this visit     Latest known visit with results " is:   Orders Only on 10/12/2021   Component Date Value   • Endomysial IgA 10/12/2021 Negative    • TISSUE TRANSGLUTAMINASE * 10/12/2021 <2    • IgA 10/12/2021 198    • White Blood Cell Count 10/12/2021 8 9    • Red Blood Cell Count 10/12/2021 4 45    • Hemoglobin 10/12/2021 13 4    • HCT 10/12/2021 40 2    • MCV 10/12/2021 90    • MCH 10/12/2021 30 1    • MCHC 10/12/2021 33 3    • RDW 10/12/2021 13 2    • Platelet Count 41/44/5689 321    • Neutrophils 10/12/2021 86    • Lymphocytes 10/12/2021 12    • Monocytes 10/12/2021 2    • Eosinophils 10/12/2021 0    • Basophils PCT 10/12/2021 0    • Neutrophils (Absolute) 10/12/2021 7 6 (H)    • Lymphocytes (Absolute) 10/12/2021 1 1    • Monocytes (Absolute) 10/12/2021 0 2    • Eosinophils (Absolute) 10/12/2021 0 0    • Basophils ABS 10/12/2021 0 0    • Immature Granulocytes 10/12/2021 0    • Immature Granulocytes (A* 10/12/2021 0 0    • Glucose, Random 10/12/2021 109 (H)    • BUN 10/12/2021 12    • Creatinine 10/12/2021 0 52 (L)    • eGFR Non  10/12/2021 134    • eGFR  10/12/2021 155    • SL AMB BUN/CREATININE RA* 10/12/2021 23    • Sodium 10/12/2021 135    • Potassium 10/12/2021 4 3    • Chloride 10/12/2021 99    • CO2 10/12/2021 23    • CALCIUM 10/12/2021 9 6    • Protein, Total 10/12/2021 7 0    • Albumin 10/12/2021 4 4    • Globulin, Total 10/12/2021 2 6    • Albumin/Globulin Ratio 10/12/2021 1 7    • TOTAL BILIRUBIN 10/12/2021 0 6    • Alk Phos Isoenzymes 10/12/2021 32 (L)    • AST 10/12/2021 12    • ALT 10/12/2021 10    • Specific Gravity 10/12/2021 1 016    • Ph 10/12/2021 6 5    • Color UA 10/12/2021 Yellow    • Urine Appearance 10/12/2021 Clear    • Leukocyte Esterase 10/12/2021 3+ (A)    • Protein 10/12/2021 Negative    • Glucose, 24 HR Urine 10/12/2021 Negative    • Ketone, Urine 10/12/2021 1+ (A)    • Blood, Urine 10/12/2021 Trace (A)    • Bilirubin, Urine 10/12/2021 Negative    • Urobilinogen Urine 10/12/2021 0 2    • SL AMB NITRITES URINE, Q* 10/12/2021 Negative    • Microscopic Examination 10/12/2021 See below:    • Urinalysis Reflex 10/12/2021 Comment    • SL AMB WBC, URINE 10/12/2021 0-5    • RBC, Urine 10/12/2021 None seen    • Epithelial Cells (non re* 10/12/2021 0-10    • Casts 10/12/2021 None seen    • Bacteria, Urine 10/12/2021 None seen    • Urine Culture, Comprehen* 10/12/2021 Final report    • Result 1 10/12/2021 Comment    • Cholesterol, Total 10/12/2021 180    • Triglycerides 10/12/2021 83    • HDL 10/12/2021 66    • VLDL Cholesterol Calcula* 10/12/2021 15    • LDL Calculated 10/12/2021 99    • LDl/HDL Ratio 10/12/2021 1 5    • Total Iron Binding Page* 10/12/2021 345    • UIBC 10/12/2021 243    • Iron, Serum 10/12/2021 102    • Iron Saturation 10/12/2021 30    • Saccharomyces cerevisiae* 10/12/2021 25 4 (H)    • Saccharomyces Cerevisiae* 10/12/2021 <20 0    • Atypical pANCA 10/12/2021 <1:20    • Vitamin C 10/12/2021 0 5    • Vitamin B6 10/12/2021 11 8    • Parietal Cell Ab 10/12/2021 1 4    • Intrinsic Factor 10/12/2021 1 0    • Vitamin A 10/12/2021 41 6    • 25-HYDROXY VIT D 10/12/2021 29 9 (L)    • HIV Screen 4th Generatio* 10/12/2021 Non Reactive    • Vitamin B1, Whole Blood 10/12/2021 132 7    • HEP C AB 10/12/2021 <0 1    • TSH 10/12/2021 0 558    • Uric Acid 10/12/2021 4 1    • Vitamin B-12 10/12/2021 385          I have personally reviewed pertinent films in PACS    No current facility-administered medications for this visit

## 2023-05-31 NOTE — PATIENT INSTRUCTIONS
Scheduled date of colonoscopy (as of today): 7/10/23  Physician performing colonoscopy: Dr Gurdeep Turner  Location of colonoscopy: [facility]  Bowel prep reviewed with patient: Miralax/ Dulcolax  Instructions reviewed with patient by: Rick Santiago  Clearances: n/a

## 2023-06-01 PROBLEM — K58.1 IRRITABLE BOWEL SYNDROME WITH CONSTIPATION: Status: ACTIVE | Noted: 2023-06-01

## 2023-07-07 ENCOUNTER — TELEPHONE (OUTPATIENT)
Dept: GASTROENTEROLOGY | Facility: CLINIC | Age: 28
End: 2023-07-07

## 2023-07-07 NOTE — TELEPHONE ENCOUNTER
Per [facility], pt needed to cancel her procedure on 7/10/23 with Dr. Dannie Reyes. I lmom for pt to please call back to reschedule. Will call pt again in one week if do not hear back from her.

## 2023-08-01 ENCOUNTER — TELEPHONE (OUTPATIENT)
Age: 28
End: 2023-08-01

## 2023-09-01 ENCOUNTER — TELEPHONE (OUTPATIENT)
Dept: ADMINISTRATIVE | Facility: OTHER | Age: 28
End: 2023-09-01

## 2023-09-01 NOTE — TELEPHONE ENCOUNTER
Upon review of the In Basket request we were able to locate, review, and update the patient chart as requested for Pap Smear (HPV) aka Cervical Cancer Screening. Any additional questions or concerns should be emailed to the Practice Liaisons via the appropriate education email address, please do not reply via In Basket.     Thank you  Edith Arriaza

## 2023-09-01 NOTE — TELEPHONE ENCOUNTER
----- Message from Marti Cedeno sent at 8/31/2023  1:35 PM EDT -----  Regarding: cervical cancer screening    Hello, our patient attached above has had Pap Smear (HPV) aka Cervical Cancer Screening completed/performed. Please assist in updating the patient chart by pulling the document from lab Tab within Chart Review. The date of service is 3/21/22.      Thank you,

## 2023-09-05 ENCOUNTER — OFFICE VISIT (OUTPATIENT)
Dept: INTERNAL MEDICINE CLINIC | Facility: CLINIC | Age: 28
End: 2023-09-05
Payer: COMMERCIAL

## 2023-09-05 VITALS
HEIGHT: 61 IN | HEART RATE: 96 BPM | WEIGHT: 117.4 LBS | DIASTOLIC BLOOD PRESSURE: 64 MMHG | OXYGEN SATURATION: 97 % | SYSTOLIC BLOOD PRESSURE: 102 MMHG | BODY MASS INDEX: 22.16 KG/M2

## 2023-09-05 DIAGNOSIS — K58.1 IRRITABLE BOWEL SYNDROME WITH CONSTIPATION: ICD-10-CM

## 2023-09-05 DIAGNOSIS — E78.2 MIXED HYPERLIPIDEMIA: Primary | ICD-10-CM

## 2023-09-05 DIAGNOSIS — R00.2 PALPITATIONS: ICD-10-CM

## 2023-09-05 PROCEDURE — 99204 OFFICE O/P NEW MOD 45 MIN: CPT | Performed by: INTERNAL MEDICINE

## 2023-09-05 RX ORDER — ROSUVASTATIN CALCIUM 10 MG/1
10 TABLET, COATED ORAL DAILY
Qty: 90 TABLET | Refills: 3 | Status: SHIPPED | OUTPATIENT
Start: 2023-09-05

## 2023-09-05 NOTE — ASSESSMENT & PLAN NOTE
[facility], had a Delpha Fret patch placed 2022 without any worrisome rhythms

## 2023-09-05 NOTE — PROGRESS NOTES
Name: Felicitas Rivas      : 1995      MRN: [id number]  Encounter Provider: Yung Wren MD  Encounter Date: 2023   Encounter department: [facility]     1. Mixed hyperlipidemia  Assessment & Plan:  Continue rosuvastatin along with healthy diet and exercise. Orders:  -     rosuvastatin (CRESTOR) 10 MG tablet; Take 1 tablet (10 mg total) by mouth daily    2. Irritable bowel syndrome with constipation  Assessment & Plan:  Follow up GI      3. Palpitations  Assessment & Plan:  Patient saw cardiology down at [facility], had a Tavo Ly patch placed  without any worrisome rhythms             Subjective     Pt here to establish care. Review of Systems   Constitutional: Negative for chills, fatigue and fever. HENT: Negative for congestion, nosebleeds, postnasal drip, sore throat and trouble swallowing. Eyes: Negative for pain. Respiratory: Negative for cough, chest tightness, shortness of breath and wheezing. Cardiovascular: Positive for palpitations (chronic, 2-3 times per year). Negative for chest pain and leg swelling. Gastrointestinal: Negative for abdominal pain, constipation, diarrhea, nausea and vomiting. Endocrine: Negative for polydipsia and polyuria. Genitourinary: Negative for dysuria, flank pain and hematuria. Musculoskeletal: Negative for arthralgias, back pain and myalgias. Skin: Negative for rash. Neurological: Negative for dizziness, tremors, light-headedness and headaches. Hematological: Does not bruise/bleed easily. Psychiatric/Behavioral: Negative for confusion and dysphoric mood. The patient is not nervous/anxious.         Past Medical History:   Diagnosis Date   • Allergic     pollen/seasonal   • Asthma    • Blood per rectum 2020   • Clotting disorder [facility]) 2020    in stool   • Cluster headaches 2020   • GERD (gastroesophageal reflux disease) dec 2020    experiencing gas pains   • Hair loss 2021   • Headache(784.0) oct 2020    in mornings   • Hypercholesterolemia     pt saw cardio in 2016 and was put on Atorvastin   • Hyperlipidemia     Patient had seen cardiologist in 2016 and was started on atorvastatin. • Hypertriglyceridemia 2020   • Mixed hyperlipidemia 2020   • Palpitations 2020   • Proctitis 2021   • School physical exam 2020   • UTI (urinary tract infection) 2021   • Vitamin D deficiency    • Vitamin D deficiency 2020     Past Surgical History:   Procedure Laterality Date   • NO PAST SURGERIES       Family History   Problem Relation Age of Onset   • Thyroid disease Mother    • Heart disease Mother         coronary arteriosclerosis   • Hyperlipidemia Father    • Hypertension Father    • Heart disease Father    • Kidney disease Maternal Grandmother    • Cancer Maternal Grandmother    • Kidney cancer Maternal Grandmother    • Stroke Maternal Grandmother    • Heart disease Maternal Grandfather    • Anxiety disorder Sister      Social History     Socioeconomic History   • Marital status: Single     Spouse name: None   • Number of children: None   • Years of education: 12   • Highest education level: None   Occupational History   • Occupation: [employer]    Tobacco Use   • Smoking status: Never   • Smokeless tobacco: Never   Vaping Use   • Vaping Use: Never used   Substance and Sexual Activity   • Alcohol use:  Yes     Alcohol/week: 1.0 standard drink of alcohol     Types: 1 Glasses of wine per week   • Drug use: Never     Comment: No use   • Sexual activity: Yes     Partners: Male     Birth control/protection: OCP   Other Topics Concern   • None   Social History Narrative    · Most recent tobacco use screenin2018      · Exercise level:   Occasional      · Diet:   Regular      · General stress level:   Medium      · Caffeine intake:   Occasional      · Guns present in home:   No      · Smoke alarm in home:   Yes      Social Determinants of Health     Financial Resource Strain: Not on file   Food Insecurity: Not on file   Transportation Needs: Not on file   Physical Activity: Not on file   Stress: Not on file   Social Connections: Not on file   Intimate Partner Violence: Not on file   Housing Stability: Not on file     Current Outpatient Medications on File Prior to Visit   Medication Sig   • linaCLOtide (Linzess) 145 MCG CAPS Take 1 capsule (145 mcg total) by mouth daily   • norethindrone-ethinyl estradiol (JUNEL FE 1/20) 1-20 MG-MCG per tablet Take 1 tablet by mouth daily   • tretinoin (REFISSA) 0.05 % cream    • [DISCONTINUED] rosuvastatin (CRESTOR) 10 MG tablet TAKE 1 TABLET BY MOUTH EVERY DAY   • B Complex Vitamins (VITAMIN B COMPLEX PO) Take by mouth (Patient not taking: Reported on 9/5/2023)   • benzoyl peroxide 5 % gel Use twice daily on active acne lesion (Patient not taking: Reported on 5/31/2023)   • minoxidil (LONITEN) 2.5 mg tablet Half tablet daily  (Patient not taking: Reported on 5/31/2023)   • Vitamin D, Cholecalciferol, 1000 units CAPS Take 1,000 Units by mouth daily (Patient not taking: Reported on 9/5/2023)   • [DISCONTINUED] atorvastatin (LIPITOR) 10 mg tablet      Allergies   Allergen Reactions   • Dust Mite Extract    • Pollen Extract      Immunization History   Administered Date(s) Administered   • DTaP 1995, 1995, 1995, 1997, 01/19/1998, 01/19/1998, 01/25/1999, 01/25/1999, 07/30/2003, 07/30/2003   • DTaP / HiB / IPV 1995, 1997, 1997, 1997, 01/19/1998, 01/19/1998, 01/25/1999, 01/25/1999   • DTaP,unspecified 1997, 1997, 01/19/1998, 01/25/1999, 07/30/2003   • HPV 06/15/2011, 08/15/2011, 12/21/2011   • HPV Quadrivalent 01/01/2010, 03/01/2010, 07/01/2010, 06/15/2011, 06/15/2011, 08/15/2011, 08/15/2011, 12/21/2011, 12/21/2011   • Hep A, ped/adol, 2 dose 01/18/1999, 09/04/2002   • Hep B, Adolescent or Pediatric 11/21/2001, 11/21/2001, 01/29/2002, 01/29/2002, 09/04/2002, 09/04/2002   • Hepatitis A 01/18/1999, 01/18/1999, 09/04/2002, 09/04/2002   • HiB 1995, 1997, 01/19/1998, 01/25/1999   • Hib (PRP-T) 1997, 1997, 01/19/1998, 01/25/1999   • INFLUENZA 10/01/2015, 10/01/2015, 11/21/2017, 11/21/2017, 12/28/2018, 12/28/2018, 09/26/2019, 09/26/2019, 10/28/2020, 11/01/2020, 11/01/2020   • IPV 1995, 1997, 1997, 1997, 01/19/1998, 01/19/1998, 01/25/1999, 01/25/1999   • Influenza, recombinant, quadrivalent,injectable, preservative free 10/27/2020   • MMR 07/10/1998, 07/10/1998, 09/04/2002, 09/04/2002   • Meningococcal 02/11/2009, 05/14/2015, 07/05/2019   • Meningococcal B 07/05/2019   • Meningococcal B, Recombinant (TRUMENBA) 12/28/2018, 12/28/2018, 07/05/2019, 07/05/2019   • Meningococcal Polysaccharide (MPSV4) 02/11/2009, 02/11/2009, 05/14/2015, 05/14/2015   • Meningococcal, Unknown Serogroups 02/11/2009, 05/14/2015   • Tdap 02/11/2009, 02/11/2009, 06/04/2020   • Tuberculin Skin Test-PPD Intradermal 06/29/2020   • Varicella 11/09/2000, 11/09/2000, 04/26/2010, 04/26/2010, 06/29/2020, 07/28/2020       Objective     /64   Pulse 96   Ht 5' 1.34" (1.558 m)   Wt 53.3 kg (117 lb 6.4 oz)   SpO2 97%   BMI 21.94 kg/m²     Physical Exam  Vitals reviewed. Constitutional:       General: She is not in acute distress. Appearance: Normal appearance. She is well-developed. HENT:      Head: Normocephalic and atraumatic. Right Ear: Tympanic membrane, ear canal and external ear normal. There is no impacted cerumen. Left Ear: Tympanic membrane, ear canal and external ear normal. There is no impacted cerumen. Mouth/Throat:      Mouth: Mucous membranes are moist.      Pharynx: No oropharyngeal exudate. Eyes:      General: No scleral icterus. Conjunctiva/sclera: Conjunctivae normal.   Neck:      Thyroid: No thyromegaly. Trachea: No tracheal deviation.    Cardiovascular:      Rate and Rhythm: Normal rate and regular rhythm. Heart sounds: Normal heart sounds. No murmur heard. Pulmonary:      Effort: Pulmonary effort is normal. No respiratory distress. Breath sounds: Normal breath sounds. No wheezing or rales. Abdominal:      General: Bowel sounds are normal.      Palpations: Abdomen is soft. Tenderness: There is no abdominal tenderness. There is no guarding or rebound. Musculoskeletal:      Cervical back: Normal range of motion and neck supple. Right lower leg: No edema. Left lower leg: No edema. Lymphadenopathy:      Cervical: No cervical adenopathy. Neurological:      General: No focal deficit present. Mental Status: She is alert and oriented to person, place, and time. Psychiatric:         Mood and Affect: Mood normal.         Behavior: Behavior normal.         Thought Content:  Thought content normal.         Judgment: Judgment normal.       Otto Parra MD

## 2023-09-05 NOTE — PATIENT INSTRUCTIONS
Problem List Items Addressed This Visit          Digestive    Irritable bowel syndrome with constipation     Follow up GI            Other    Mixed hyperlipidemia - Primary     Continue rosuvastatin along with healthy diet and exercise.          Relevant Medications    rosuvastatin (CRESTOR) 10 MG tablet    Palpitations     Patient saw cardiology down at [facility], had a ZIO patch placed 2022 without any worrisome rhythms

## 2023-09-06 RX ORDER — SODIUM CHLORIDE, SODIUM LACTATE, POTASSIUM CHLORIDE, CALCIUM CHLORIDE 600; 310; 30; 20 MG/100ML; MG/100ML; MG/100ML; MG/100ML
75 INJECTION, SOLUTION INTRAVENOUS CONTINUOUS
Status: CANCELLED | OUTPATIENT
Start: 2023-09-06

## 2023-09-07 ENCOUNTER — HOSPITAL ENCOUNTER (OUTPATIENT)
Dept: GASTROENTEROLOGY | Facility: AMBULARY SURGERY CENTER | Age: 28
Setting detail: OUTPATIENT SURGERY
End: 2023-09-07
Attending: INTERNAL MEDICINE
Payer: COMMERCIAL

## 2023-09-07 ENCOUNTER — ANESTHESIA EVENT (OUTPATIENT)
Dept: GASTROENTEROLOGY | Facility: AMBULARY SURGERY CENTER | Age: 28
End: 2023-09-07
Payer: COMMERCIAL

## 2023-09-07 VITALS
RESPIRATION RATE: 16 BRPM | TEMPERATURE: 98.2 F | SYSTOLIC BLOOD PRESSURE: 97 MMHG | HEART RATE: 78 BPM | DIASTOLIC BLOOD PRESSURE: 69 MMHG | OXYGEN SATURATION: 100 %

## 2023-09-07 DIAGNOSIS — K58.1 IRRITABLE BOWEL SYNDROME WITH CONSTIPATION: ICD-10-CM

## 2023-09-07 DIAGNOSIS — K62.5 RECTAL BLEEDING: ICD-10-CM

## 2023-09-07 LAB
EXT PREGNANCY TEST URINE: NEGATIVE
EXT. CONTROL: NORMAL

## 2023-09-07 PROCEDURE — 88305 TISSUE EXAM BY PATHOLOGIST: CPT | Performed by: PATHOLOGY

## 2023-09-07 PROCEDURE — 88342 IMHCHEM/IMCYTCHM 1ST ANTB: CPT | Performed by: PATHOLOGY

## 2023-09-07 PROCEDURE — 81025 URINE PREGNANCY TEST: CPT | Performed by: ANESTHESIOLOGY

## 2023-09-07 RX ORDER — LIDOCAINE HYDROCHLORIDE 10 MG/ML
INJECTION, SOLUTION EPIDURAL; INFILTRATION; INTRACAUDAL; PERINEURAL AS NEEDED
Status: DISCONTINUED | OUTPATIENT
Start: 2023-09-07 | End: 2023-09-07

## 2023-09-07 RX ORDER — PROPOFOL 10 MG/ML
INJECTION, EMULSION INTRAVENOUS AS NEEDED
Status: DISCONTINUED | OUTPATIENT
Start: 2023-09-07 | End: 2023-09-07

## 2023-09-07 RX ORDER — PROPOFOL 10 MG/ML
INJECTION, EMULSION INTRAVENOUS CONTINUOUS PRN
Status: DISCONTINUED | OUTPATIENT
Start: 2023-09-07 | End: 2023-09-07

## 2023-09-07 RX ORDER — SODIUM CHLORIDE, SODIUM LACTATE, POTASSIUM CHLORIDE, CALCIUM CHLORIDE 600; 310; 30; 20 MG/100ML; MG/100ML; MG/100ML; MG/100ML
75 INJECTION, SOLUTION INTRAVENOUS CONTINUOUS
Status: DISCONTINUED | OUTPATIENT
Start: 2023-09-07 | End: 2023-09-11 | Stop reason: HOSPADM

## 2023-09-07 RX ADMIN — LIDOCAINE HYDROCHLORIDE 50 MG: 10 INJECTION, SOLUTION EPIDURAL; INFILTRATION; INTRACAUDAL at 09:26

## 2023-09-07 RX ADMIN — PROPOFOL 140 MCG/KG/MIN: 10 INJECTION, EMULSION INTRAVENOUS at 09:26

## 2023-09-07 RX ADMIN — PROPOFOL 150 MG: 10 INJECTION, EMULSION INTRAVENOUS at 09:26

## 2023-09-07 RX ADMIN — SODIUM CHLORIDE, SODIUM LACTATE, POTASSIUM CHLORIDE, AND CALCIUM CHLORIDE 75 ML/HR: .6; .31; .03; .02 INJECTION, SOLUTION INTRAVENOUS at 08:54

## 2023-09-07 NOTE — ANESTHESIA POSTPROCEDURE EVALUATION
Post-Op Assessment Note    CV Status:  Stable  Pain Score: 0    Pain management: adequate     Mental Status:  Alert and awake   Hydration Status:  Euvolemic   PONV Controlled:  Controlled   Airway Patency:  Patent      Post Op Vitals Reviewed: Yes      Staff: CRNA         No notable events documented.     BP   96/59   Temp      Pulse 72   Resp 14   SpO2 98

## 2023-09-07 NOTE — H&P
History and Physical -  [facility]  Jose Maria Grullon 34 y.o. female MRN: [id number]    HPI: Jose Maria Grullon is a 34y.o. year old female who presents for evaluation of rectal bleeding. Review of Systems    Historical Information   Past Medical History:   Diagnosis Date   • Allergic     pollen/seasonal   • Asthma    • Blood per rectum 12/23/2020   • Clotting disorder [facility]) september 2020    in stool   • Cluster headaches 12/23/2020   • GERD (gastroesophageal reflux disease) dec 2020    experiencing gas pains   • Hair loss 7/19/2021   • Headache(784.0) oct 2020    in mornings   • Hypercholesterolemia     pt saw cardio in 2016 and was put on Atorvastin   • Hyperlipidemia     Patient had seen cardiologist in 2016 and was started on atorvastatin.     • Hypertriglyceridemia 6/4/2020   • Mixed hyperlipidemia 6/4/2020   • Palpitations 12/23/2020   • Proctitis 7/19/2021   • School physical exam 6/4/2020   • UTI (urinary tract infection) 7/19/2021   • Vitamin D deficiency    • Vitamin D deficiency 6/4/2020     Past Surgical History:   Procedure Laterality Date   • NO PAST SURGERIES       Social History   Social History     Substance and Sexual Activity   Alcohol Use Yes   • Alcohol/week: 1.0 standard drink of alcohol   • Types: 1 Glasses of wine per week     Social History     Substance and Sexual Activity   Drug Use Never    Comment: No use     Social History     Tobacco Use   Smoking Status Never   Smokeless Tobacco Never     Family History   Problem Relation Age of Onset   • Thyroid disease Mother    • Heart disease Mother         coronary arteriosclerosis   • Hyperlipidemia Father    • Hypertension Father    • Heart disease Father    • Kidney disease Maternal Grandmother    • Cancer Maternal Grandmother    • Kidney cancer Maternal Grandmother    • Stroke Maternal Grandmother    • Heart disease Maternal Grandfather    • Anxiety disorder Sister        Meds/Allergies     No medications prior to admission. Allergies   Allergen Reactions   • Dust Mite Extract    • Pollen Extract        Objective     There were no vitals taken for this visit. PHYSICAL EXAM    Gen: NAD  CV: RRR  CHEST: Clear  ABD: soft, NT/ND  EXT: no edema  Neuro: AAO      ASSESSMENT/PLAN:  This is a 34y.o. year old female here for evaluation of rectal bleeding. PLAN:   Procedure: Colonoscopy.

## 2023-09-07 NOTE — ANESTHESIA PREPROCEDURE EVALUATION
Procedure:  COLONOSCOPY    Relevant Problems   CARDIO   (+) Hypertriglyceridemia   (+) Mixed hyperlipidemia      GI/HEPATIC   (+) Blood per rectum      NEURO/PSYCH   (+) Cluster headaches        Physical Exam    Airway    Mallampati score: II  TM Distance: >3 FB  Neck ROM: full     Dental       Cardiovascular  Rhythm: regular, Rate: normal,     Pulmonary  Breath sounds clear to auscultation,     Other Findings        Anesthesia Plan  ASA Score- 2     Anesthesia Type- IV sedation with anesthesia with ASA Monitors. Additional Monitors:   Airway Plan:           Plan Factors-    Chart reviewed. Patient is not a current smoker. Induction- intravenous. Postoperative Plan-     Informed Consent- Anesthetic plan and risks discussed with patient. I personally reviewed this patient with the CRNA. Discussed and agreed on the Anesthesia Plan with the CRNA. Venkatesh Summers

## 2023-09-12 PROCEDURE — 88342 IMHCHEM/IMCYTCHM 1ST ANTB: CPT | Performed by: PATHOLOGY

## 2023-09-12 PROCEDURE — 88305 TISSUE EXAM BY PATHOLOGIST: CPT | Performed by: PATHOLOGY

## 2023-09-14 DIAGNOSIS — K62.89 PROCTITIS: Primary | ICD-10-CM

## 2023-09-14 RX ORDER — MESALAMINE 1000 MG/1
1000 SUPPOSITORY RECTAL
Qty: 90 SUPPOSITORY | Refills: 1 | Status: SHIPPED | OUTPATIENT
Start: 2023-09-14 | End: 2023-12-13